# Patient Record
Sex: FEMALE | Race: WHITE
[De-identification: names, ages, dates, MRNs, and addresses within clinical notes are randomized per-mention and may not be internally consistent; named-entity substitution may affect disease eponyms.]

---

## 2019-03-21 ENCOUNTER — HOSPITAL ENCOUNTER (INPATIENT)
Dept: HOSPITAL 90 - LDH | Age: 29
LOS: 1 days | Discharge: HOME | End: 2019-03-22
Attending: OBSTETRICS & GYNECOLOGY | Admitting: OBSTETRICS & GYNECOLOGY
Payer: COMMERCIAL

## 2019-03-21 VITALS — WEIGHT: 186 LBS | BODY MASS INDEX: 29.89 KG/M2 | HEIGHT: 66 IN

## 2019-03-21 VITALS — SYSTOLIC BLOOD PRESSURE: 108 MMHG | DIASTOLIC BLOOD PRESSURE: 68 MMHG

## 2019-03-21 VITALS — DIASTOLIC BLOOD PRESSURE: 68 MMHG | SYSTOLIC BLOOD PRESSURE: 118 MMHG

## 2019-03-21 VITALS — SYSTOLIC BLOOD PRESSURE: 102 MMHG | DIASTOLIC BLOOD PRESSURE: 51 MMHG

## 2019-03-21 DIAGNOSIS — Z3A.39: ICD-10-CM

## 2019-03-21 DIAGNOSIS — Z23: ICD-10-CM

## 2019-03-21 LAB
ERYTHROCYTE [DISTWIDTH] IN BLOOD BY AUTOMATED COUNT: 14.1 % (ref 11–15.5)
HCT VFR BLD AUTO: 39.1 % (ref 36–48)
MCH RBC QN AUTO: 31.6 PG (ref 27–33)
MCHC RBC AUTO-ENTMCNC: 34.5 G/DL (ref 32–36)
MCV RBC AUTO: 91.5 FL (ref 79–99)
NRBC BLD MANUAL-RTO: 0.1 % (ref 0–0.19)
PH UR STRIP: 6.5 [PH] (ref 5–8)
PLATELET # BLD AUTO: 266 K/UL (ref 130–400)
RBC # BLD AUTO: 4.27 MIL/UL (ref 4–5.5)
RBC #/AREA URNS HPF: (no result) /HPF (ref 0–1)
SP GR UR STRIP: 1.02 (ref 1–1.03)
UROBILINOGEN UR STRIP-MCNC: 1 MG/DL (ref 0.2–1)
WBC # BLD AUTO: 8 K/UL (ref 4.8–10.8)
WBC #/AREA URNS HPF: (no result) /HPF (ref 0–1)

## 2019-03-21 PROCEDURE — 85014 HEMATOCRIT: CPT

## 2019-03-21 PROCEDURE — 87340 HEPATITIS B SURFACE AG IA: CPT

## 2019-03-21 PROCEDURE — 86850 RBC ANTIBODY SCREEN: CPT

## 2019-03-21 PROCEDURE — 86900 BLOOD TYPING SEROLOGIC ABO: CPT

## 2019-03-21 PROCEDURE — 36415 COLL VENOUS BLD VENIPUNCTURE: CPT

## 2019-03-21 PROCEDURE — 00HU33Z INSERTION OF INFUSION DEVICE INTO SPINAL CANAL, PERCUTANEOUS APPROACH: ICD-10-PCS | Performed by: OBSTETRICS & GYNECOLOGY

## 2019-03-21 PROCEDURE — 3E033VJ INTRODUCTION OF OTHER HORMONE INTO PERIPHERAL VEIN, PERCUTANEOUS APPROACH: ICD-10-PCS | Performed by: OBSTETRICS & GYNECOLOGY

## 2019-03-21 PROCEDURE — 0DQR0ZZ REPAIR ANAL SPHINCTER, OPEN APPROACH: ICD-10-PCS | Performed by: OBSTETRICS & GYNECOLOGY

## 2019-03-21 PROCEDURE — 86901 BLOOD TYPING SEROLOGIC RH(D): CPT

## 2019-03-21 PROCEDURE — 85018 HEMOGLOBIN: CPT

## 2019-03-21 PROCEDURE — 85027 COMPLETE CBC AUTOMATED: CPT

## 2019-03-21 PROCEDURE — 86592 SYPHILIS TEST NON-TREP QUAL: CPT

## 2019-03-21 PROCEDURE — 3E0134Z INTRODUCTION OF SERUM, TOXOID AND VACCINE INTO SUBCUTANEOUS TISSUE, PERCUTANEOUS APPROACH: ICD-10-PCS | Performed by: OBSTETRICS & GYNECOLOGY

## 2019-03-21 PROCEDURE — 3E0R3BZ INTRODUCTION OF ANESTHETIC AGENT INTO SPINAL CANAL, PERCUTANEOUS APPROACH: ICD-10-PCS | Performed by: OBSTETRICS & GYNECOLOGY

## 2019-03-21 PROCEDURE — 81001 URINALYSIS AUTO W/SCOPE: CPT

## 2019-03-21 RX ADMIN — IBUPROFEN SCH MG: 800 TABLET ORAL at 19:07

## 2019-03-21 RX ADMIN — DOCUSATE SODIUM SCH MG: 100 TABLET, FILM COATED ORAL at 22:37

## 2019-03-22 VITALS — SYSTOLIC BLOOD PRESSURE: 111 MMHG | DIASTOLIC BLOOD PRESSURE: 73 MMHG

## 2019-03-22 VITALS — SYSTOLIC BLOOD PRESSURE: 99 MMHG | DIASTOLIC BLOOD PRESSURE: 53 MMHG

## 2019-03-22 VITALS — DIASTOLIC BLOOD PRESSURE: 56 MMHG | SYSTOLIC BLOOD PRESSURE: 92 MMHG

## 2019-03-22 VITALS — DIASTOLIC BLOOD PRESSURE: 48 MMHG | SYSTOLIC BLOOD PRESSURE: 103 MMHG

## 2019-03-22 LAB — HCT VFR BLD AUTO: 28.8 % (ref 36–48)

## 2019-03-22 RX ADMIN — IBUPROFEN SCH MG: 800 TABLET ORAL at 13:17

## 2019-03-22 RX ADMIN — IBUPROFEN SCH MG: 800 TABLET ORAL at 02:49

## 2019-03-22 RX ADMIN — IBUPROFEN SCH MG: 800 TABLET ORAL at 18:47

## 2019-03-22 RX ADMIN — DOCUSATE SODIUM SCH MG: 100 TABLET, FILM COATED ORAL at 08:31

## 2019-03-22 NOTE — NUR
ASSESSMENT: RECEIVED RESTING IN BED WITH HER BABY,  IN THE ROOM. EXPLAINED POC, SITZ 
BATH , SHOWER AND UNDERSTANDING VERBALIZED, CALL BELL AT HER SIDE.

## 2019-03-22 NOTE — NUR
lab values: REPORTED H&H OF 10.1, 28.8 TO DR GOSIA PRITCHETT. ORDERS TO DISCHARGE HOME  THIS PM, PT 
AND  MADE AWARE.

## 2019-03-22 NOTE — NUR
DISCHARGE: DISCHARGE INSTRUCTIONS GIVEN TO PT ON SELF CARE POST VAG DELIVERY WITH 3RD DEGREE 
VAG LACERATION. SITZ BATH, ANEMIA, DIETS TO FOLLOW. REVIEWED RX'S AND TO FOLLOW WITH DR PRITCHETT, ON 04/09 AT 0930 AM OR SOONER IF NEEDED. UNDERSTANDING VERBALIZED AND COPIES OF ALL 
INSTRUCTIONS GIVEN TO PT.

## 2019-03-22 NOTE — NUR
ASSESSMENT: DR PRITCHETT TALKING WITH PT. PT STATES FEELS LIGHTHEADED, ASSISTED BACK IN BED, AND 
ENCOURAGED TO DRINK MORE FLUIDS, H&H WILL BE DRAWN. ONCE LAYING DOWN STATES FEELS BETTER. 
CALL BELL AT HER SIDE. INSTRUCTED TO CALL FOR ASSISTANCE WHEN GETTING UP.  AT HER 
SIDE.